# Patient Record
Sex: MALE | Race: ASIAN | NOT HISPANIC OR LATINO | URBAN - METROPOLITAN AREA
[De-identification: names, ages, dates, MRNs, and addresses within clinical notes are randomized per-mention and may not be internally consistent; named-entity substitution may affect disease eponyms.]

---

## 2018-01-05 ENCOUNTER — EMERGENCY (EMERGENCY)
Facility: HOSPITAL | Age: 53
LOS: 0 days | Discharge: HOME | End: 2018-01-05
Attending: EMERGENCY MEDICINE | Admitting: EMERGENCY MEDICINE
Payer: MEDICAID

## 2018-01-05 VITALS
RESPIRATION RATE: 18 BRPM | TEMPERATURE: 98 F | DIASTOLIC BLOOD PRESSURE: 68 MMHG | HEART RATE: 80 BPM | OXYGEN SATURATION: 100 % | SYSTOLIC BLOOD PRESSURE: 104 MMHG

## 2018-01-05 VITALS — WEIGHT: 160.06 LBS | HEIGHT: 66 IN

## 2018-01-05 DIAGNOSIS — R13.10 DYSPHAGIA, UNSPECIFIED: ICD-10-CM

## 2018-01-05 DIAGNOSIS — R09.89 OTHER SPECIFIED SYMPTOMS AND SIGNS INVOLVING THE CIRCULATORY AND RESPIRATORY SYSTEMS: ICD-10-CM

## 2018-01-05 PROCEDURE — 70490 CT SOFT TISSUE NECK W/O DYE: CPT | Mod: 26

## 2018-01-05 PROCEDURE — 70360 X-RAY EXAM OF NECK: CPT | Mod: 26

## 2018-01-05 PROCEDURE — 99285 EMERGENCY DEPT VISIT HI MDM: CPT

## 2018-01-05 RX ORDER — LIDOCAINE 4 G/100G
30 CREAM TOPICAL ONCE
Qty: 0 | Refills: 0 | Status: COMPLETED | OUTPATIENT
Start: 2018-01-05 | End: 2018-01-05

## 2018-01-05 RX ADMIN — LIDOCAINE 30 MILLILITER(S): 4 CREAM TOPICAL at 17:26

## 2018-01-05 NOTE — ED STATDOCS - OBJECTIVE STATEMENT
51 y/o male presents to the ED c/o sea bass bone stuck in his throat since 2pm today. Pt has had this happen before but is usually able to get it out by eating food. +Throat pain. Denies neck pain, NVD, abd pain. 51 y/o male presents to the ED c/o sea bass bone stuck in his throat since 2pm today. Pt has had this happen before but is usually able to get it out by eating food. +Throat pain on left side. Able to swallow liquids. Ate a cheeseburger after incident without difficulty. No sob, chest pain. Denies neck pain, NVD, abd pain.

## 2018-01-05 NOTE — ED STATDOCS - MEDICAL DECISION MAKING DETAILS
Pt presenting with foreign body sensation left throat s/p eating fish. Concern for fish bone impaction. No sign of airway compromise. Plan for imaging to locate fish bone as it is not visible. Viscus lidocaine treatment.

## 2018-01-05 NOTE — ED STATDOCS - PROGRESS NOTE DETAILS
Pt. presents with left sided throat pain after eating a fish this morning.  Pt. tried to eat a steamed bun but pain continued.  Pt. was able to eat a hamburger this evening.  Neg. N/V, SOB, hemoptysis or CP.  PMD:  in [place].  Sonal Madden PA-C xray negative for foreign body.  Will obtain CT/soft tissue neck to rule out FB.   Pt. aware.  Sonal Madden PA-C Pt. wife keeps coming to desk asking how long for the CT.  Pt. counseled on how busy ED is and please try to be patient.  Will continue to monitor.   Sonal Madden PA-C No fishbone on CT.  tolerating PO fluids and solids.  Will FU with GI.

## 2018-01-05 NOTE — ED STATDOCS - ENMT, MLM
Nasal mucosa clear.  Mouth with normal mucosa  Throat has no vesicles, no oropharyngeal exudates and uvula is midline. No foreign body. No lymphadenopathy Nasal mucosa clear.  Mouth with normal mucosa  Throat has no vesicles, no oropharyngeal exudates and uvula is midline. No foreign body. No lymphadenopathy. No fb noted. No swelling or bleeding in  airway. No drooling or pooling of secretions.

## 2024-12-17 NOTE — ED STATDOCS - NORMAL, MLM
Patient called office with request to see Dr. Patel regarding breast lesion.  Patient scheduled.     
herrera all pertinent systems normal

## 2025-03-24 ENCOUNTER — APPOINTMENT (OUTPATIENT)
Dept: RADIOLOGY | Facility: CLINIC | Age: 60
End: 2025-03-24
Payer: MEDICAID

## 2025-03-24 ENCOUNTER — OUTPATIENT (OUTPATIENT)
Dept: OUTPATIENT SERVICES | Facility: HOSPITAL | Age: 60
LOS: 1 days | End: 2025-03-24
Payer: MEDICAID

## 2025-03-24 DIAGNOSIS — Z00.8 ENCOUNTER FOR OTHER GENERAL EXAMINATION: ICD-10-CM

## 2025-03-24 PROCEDURE — 71046 X-RAY EXAM CHEST 2 VIEWS: CPT

## 2025-03-24 PROCEDURE — 77080 DXA BONE DENSITY AXIAL: CPT

## 2025-03-24 PROCEDURE — 77080 DXA BONE DENSITY AXIAL: CPT | Mod: 26

## 2025-03-24 PROCEDURE — 71046 X-RAY EXAM CHEST 2 VIEWS: CPT | Mod: 26

## 2025-04-01 ENCOUNTER — APPOINTMENT (OUTPATIENT)
Dept: RADIOLOGY | Facility: CLINIC | Age: 60
End: 2025-04-01

## 2025-07-22 ENCOUNTER — EMERGENCY (EMERGENCY)
Facility: HOSPITAL | Age: 60
LOS: 0 days | Discharge: HOME | End: 2025-07-22
Attending: EMERGENCY MEDICINE
Payer: MEDICAID

## 2025-07-22 VITALS — WEIGHT: 160.06 LBS | HEIGHT: 66 IN

## 2025-07-22 VITALS
WEIGHT: 159.39 LBS | SYSTOLIC BLOOD PRESSURE: 121 MMHG | TEMPERATURE: 98 F | OXYGEN SATURATION: 100 % | HEART RATE: 77 BPM | DIASTOLIC BLOOD PRESSURE: 75 MMHG | RESPIRATION RATE: 16 BRPM

## 2025-07-22 DIAGNOSIS — S92.351A DISPLACED FRACTURE OF FIFTH METATARSAL BONE, RIGHT FOOT, INITIAL ENCOUNTER FOR CLOSED FRACTURE: ICD-10-CM
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
## 2025-07-22 DIAGNOSIS — M79.671 PAIN IN RIGHT FOOT: ICD-10-CM

## 2025-07-22 PROCEDURE — 73610 X-RAY EXAM OF ANKLE: CPT | Mod: RT

## 2025-07-22 PROCEDURE — 73630 X-RAY EXAM OF FOOT: CPT | Mod: 26,RT

## 2025-07-22 PROCEDURE — 99284 EMERGENCY DEPT VISIT MOD MDM: CPT | Mod: 25

## 2025-07-22 PROCEDURE — 73630 X-RAY EXAM OF FOOT: CPT | Mod: RT

## 2025-07-22 PROCEDURE — 28470 CLTX METATARSAL FX WO MNP EA: CPT | Mod: RT

## 2025-07-22 PROCEDURE — 99284 EMERGENCY DEPT VISIT MOD MDM: CPT

## 2025-07-22 PROCEDURE — 73610 X-RAY EXAM OF ANKLE: CPT | Mod: 26,RT

## 2025-07-22 NOTE — ED STATDOCS - PATIENT PORTAL LINK FT
You can access the FollowMyHealth Patient Portal offered by [facility] by registering at the following website: [email or web address]. By joining [facility]’s FollowMyHealth portal, you will also be able to view your health information using other applications (apps) compatible with our system.

## 2025-07-22 NOTE — ED STATDOCS - CARE PROVIDERS DIRECT ADDRESSES
,[email or web address],[email or web address]

## 2025-07-22 NOTE — ED STATDOCS - PHYSICAL EXAMINATION
Constitutional: NAD AAOx3  Eyes: EOMI, pupils equal  Head: Normocephalic atraumatic  Mouth: no airway obstruction  Cardiac: regular rate   Resp: Lungs CTAB  GI: Abd s/nt/nd  Neuro: CN2-12 intact  Skin: No rashes   Msk: tenderness to palpation dorsum R foot laterally, sensations intact, distal neurovascular motor intact, no tenderness proximal tib fib

## 2025-07-22 NOTE — ED STATDOCS - OBJECTIVE STATEMENT
Mandarin  used, ID# [id number]: 61 y/o M with R foot pain. Pt reports that he twisted his ankle this am s/p trip and fall walking down one step. -acs. Pt states that pain is tolerable currently.

## 2025-07-22 NOTE — ED STATDOCS - CARE PROVIDER_API CALL
Geo Sneed)  Podiatry Foot & Ankle Surgery  [address], [address]  [place] [address]  Phone: [phone]  Fax: [phone]  Follow Up Time:     Mina Shirley)  Podiatry  [address], [address]  [place] [address]  Phone: [phone]  Fax: [phone]  Follow Up Time:

## 2025-07-22 NOTE — ED STATDOCS - WR ORDER DATE AND TIME 2
Problem: Communication  Goal: The ability to communicate needs accurately and effectively will improve  Outcome: PROGRESSING AS EXPECTED   Patient encouraged and educated on the importance of communicating with care providers. Patient states understanding. Call light use given. Patient calls for assistance.          22-Jul-2025 15:31

## 2025-07-22 NOTE — ED ADULT TRIAGE NOTE - CHIEF COMPLAINT QUOTE
patient c/o right foot pain.  patient tripped walking down one step a few hours PTA and twisted ankle

## 2025-07-22 NOTE — ED STATDOCS - PROGRESS NOTE DETAILS
History with assistance from Mandarin  ID [id number]. 61 y/o M with no reported PMH presents with right foot injury. Pt was walking down stairs, tripped and injured his right foot/ankle in process. No additional injuries noted. No reported head trauma, LOC. PE: Well appearing. Cardiac: s1s2, RRR. Lungs: CTAB. MSK: +edema, ecchymosis dorsal/lateral right foot with tenderness. NO open wound. Sensation intact to light touch in lower extremities. Cap refill < 2 sec in LE digits. A/P: r/o fracture, plan for XR, refusing pain medication at this time. - Sudhakar Cantor PA-C

## 2025-07-22 NOTE — ED STATDOCS - NSFOLLOWUPINSTRUCTIONS_ED_ALL_ED_FT
CALL TOMORROW FOR PODIATRY APPOINTMENT. PLEASE FOLLOW UP WITHIN 1 WEEK.     Metatarsal Fracture  Bones of the ankle and foot, showing the metatarsal bones.  A metatarsal fracture is a break in one of the five bones that connect the toes to the rest of the foot. This may also be called a forefoot fracture. A metatarsal fracture may be:  A crack in the surface of the bone (stress fracture). This often occurs in athletes.  A break all the way through the bone (complete fracture).  The bone that connects to the little toe (fifth metatarsal) is most commonly fractured. Ballet dancers and other athletes often fracture this bone.    What are the causes?  A metatarsal fracture may be caused by:  Sudden twisting of the foot.  Falling onto the foot.  Something heavy falling onto the foot.  Overuse or repeated exercise.  What increases the risk?  This condition is more likely to develop in people who:  Play contact sports.  Are runners.  Do ballet.  Have weak bones (osteoporosis).  Have low calcium levels.  What are the signs or symptoms?  Symptoms of this condition include:  Pain that gets worse when walking or standing.  Pain when pressing on the foot or moving the toes.  Swelling.  Bruising on the top or bottom of the foot.  How is this diagnosed?  This condition may be diagnosed based on:  Your symptoms.  Any recent foot injuries you have had.  A physical exam.  An X-ray of your foot. If you have a stress fracture, it may not show up on an X-ray. You may need other imaging tests, such as:  A bone scan.  CT scan.  MRI.  How is this treated?  Treatment depends on how severe your fracture is and how the pieces of the broken bone line up with each other (alignment). Treatment may involve:  Wearing a cast, splint, or supportive boot on your foot.  Using crutches and notputting any weight on your foot.  Having surgery to align broken bones and hold them in place while they heal (open reduction and internal fixation or percutaneous pinning).  Physical therapy exercises to improve movement and strength in your foot.  Follow these instructions at home:  If you have a removable splint or boot:    Wear the splint or boot as told by your health care provider. Remove it only as told by your provider.  Check the skin around the splint or boot every day. Tell your provider about any concerns.  Loosen the splint or boot if your toes tingle, become numb, or turn cold and blue.  Keep the splint or boot clean and dry.  If you have a nonremovable cast:    Do not put pressure on any part of the cast until it is fully hardened. This may take several hours.  Do not stick anything inside the cast to scratch your skin. Doing that increases your risk of infection.  Check the skin around the cast every day. Tell your provider about any concerns.  You may put lotion on dry skin around the edges of the cast. Do not put lotion on the skin underneath the cast.  Keep the cast clean and dry.  Bathing    If the cast, splint, or boot is not waterproof:  Do not let it get wet.  Cover it with a watertight covering when you take a bath or shower.  Activity    Do not use your affected leg to support your body weight until your provider says that you can. Use crutches as told by your provider.  Ask your provider what activities are safe for you during recovery. Ask what activities you need to avoid.  Do exercises as told by your provider.  Driving    Ask your provider if the medicine prescribed to you requires you to avoid driving or using machinery.  Do not drive while wearing a cast, splint, or boot on a foot that you use for driving.  Managing pain, stiffness, and swelling    A bag of ice on a towel on the skin.  If told, put ice on the painful area.  If you have a removable splint or boot, remove it as told by your provider.  Put ice in a plastic bag.  Place a towel between your skin and the bag or between your cast and the bag.  Leave the ice on for 20 minutes, 2–3 times a day.  If your skin turns bright red, remove the ice right away to prevent skin damage. The risk of damage is higher if you cannot feel pain, heat, or cold.  Move your toes often to reduce stiffness and swelling.  Raise (elevate) your lower leg above the level of your heart while you are sitting or lying down.  General instructions    Take over-the-counter and prescription medicines only as told by your provider.  Do not use any products that contain nicotine or tobacco. These products include cigarettes, chewing tobacco, and vaping devices, such as e-cigarettes. These can delay bone healing. If you need help quitting, ask your provider.  Do not take baths, swim, or use a hot tub until your provider approves. Ask your provider if you may take showers.  Keep all follow-up visits. Your provider will check to see how you are healing. This may include more X-rays.  Contact a health care provider if:  You have pain that gets worse or does not improve with medicine.  You have a fever.  You have a bad smell coming from your cast or splint or if the cast or splint gets wet.  Get help right away if:  You have any of the following in your toes or foot, even after loosening your splint (if you have one):  Numbness.  Tingling.  Coldness.  Blue skin.  Redness or swelling that gets worse.  You have pain that suddenly becomes severe.  This information is not intended to replace advice given to you by your health care provider. Make sure you discuss any questions you have with your health care provider.